# Patient Record
Sex: MALE | Race: WHITE | NOT HISPANIC OR LATINO
[De-identification: names, ages, dates, MRNs, and addresses within clinical notes are randomized per-mention and may not be internally consistent; named-entity substitution may affect disease eponyms.]

---
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
## 2021-02-01 ENCOUNTER — TRANSCRIPTION ENCOUNTER (OUTPATIENT)
Age: 21
End: 2021-02-01

## 2022-12-31 ENCOUNTER — EMERGENCY (EMERGENCY)
Facility: HOSPITAL | Age: 22
LOS: 1 days | Discharge: HOME | End: 2022-12-31
Attending: EMERGENCY MEDICINE
Payer: COMMERCIAL

## 2022-12-31 VITALS
RESPIRATION RATE: 18 BRPM | SYSTOLIC BLOOD PRESSURE: 113 MMHG | DIASTOLIC BLOOD PRESSURE: 69 MMHG | OXYGEN SATURATION: 100 % | HEART RATE: 93 BPM | TEMPERATURE: 98 F

## 2022-12-31 VITALS
SYSTOLIC BLOOD PRESSURE: 124 MMHG | RESPIRATION RATE: 18 BRPM | OXYGEN SATURATION: 94 % | HEART RATE: 124 BPM | WEIGHT: 160.06 LBS | TEMPERATURE: 98 F | HEIGHT: 72 IN | DIASTOLIC BLOOD PRESSURE: 72 MMHG

## 2022-12-31 DIAGNOSIS — J03.90 ACUTE TONSILLITIS, UNSPECIFIED: ICD-10-CM

## 2022-12-31 LAB
ALBUMIN SERPL ELPH-MCNC: 3.9 G/DL — SIGNIFICANT CHANGE UP (ref 3.3–5)
ALP SERPL-CCNC: 84 U/L — SIGNIFICANT CHANGE UP (ref 40–120)
ALT FLD-CCNC: 28 U/L — SIGNIFICANT CHANGE UP (ref 10–45)
ANION GAP SERPL CALC-SCNC: 13 MMOL/L — SIGNIFICANT CHANGE UP (ref 5–17)
AST SERPL-CCNC: 27 U/L — SIGNIFICANT CHANGE UP (ref 10–40)
BASOPHILS # BLD AUTO: 0 K/UL — SIGNIFICANT CHANGE UP (ref 0–0.2)
BASOPHILS NFR BLD AUTO: 0 % — SIGNIFICANT CHANGE UP (ref 0–2)
BILIRUB SERPL-MCNC: 0.6 MG/DL — SIGNIFICANT CHANGE UP (ref 0.2–1.2)
BUN SERPL-MCNC: 16 MG/DL — SIGNIFICANT CHANGE UP (ref 7–23)
CALCIUM SERPL-MCNC: 10 MG/DL — SIGNIFICANT CHANGE UP (ref 8.4–10.5)
CHLORIDE SERPL-SCNC: 98 MMOL/L — SIGNIFICANT CHANGE UP (ref 96–108)
CO2 SERPL-SCNC: 24 MMOL/L — SIGNIFICANT CHANGE UP (ref 22–31)
CREAT SERPL-MCNC: 0.98 MG/DL — SIGNIFICANT CHANGE UP (ref 0.5–1.3)
EGFR: 113 ML/MIN/1.73M2 — SIGNIFICANT CHANGE UP
EOSINOPHIL # BLD AUTO: 0 K/UL — SIGNIFICANT CHANGE UP (ref 0–0.5)
EOSINOPHIL NFR BLD AUTO: 0 % — SIGNIFICANT CHANGE UP (ref 0–6)
GIANT PLATELETS BLD QL SMEAR: PRESENT — SIGNIFICANT CHANGE UP
GLUCOSE SERPL-MCNC: 115 MG/DL — HIGH (ref 70–99)
HCT VFR BLD CALC: 38.6 % — LOW (ref 39–50)
HGB BLD-MCNC: 14 G/DL — SIGNIFICANT CHANGE UP (ref 13–17)
HIV 1+2 AB+HIV1 P24 AG SERPL QL IA: SIGNIFICANT CHANGE UP
LYMPHOCYTES # BLD AUTO: 1.26 K/UL — SIGNIFICANT CHANGE UP (ref 1–3.3)
LYMPHOCYTES # BLD AUTO: 9.3 % — LOW (ref 13–44)
MANUAL SMEAR VERIFICATION: SIGNIFICANT CHANGE UP
MCHC RBC-ENTMCNC: 31.3 PG — SIGNIFICANT CHANGE UP (ref 27–34)
MCHC RBC-ENTMCNC: 36.3 GM/DL — HIGH (ref 32–36)
MCV RBC AUTO: 86.2 FL — SIGNIFICANT CHANGE UP (ref 80–100)
MONOCYTES # BLD AUTO: 1.39 K/UL — HIGH (ref 0–0.9)
MONOCYTES NFR BLD AUTO: 10.2 % — SIGNIFICANT CHANGE UP (ref 2–14)
NEUTROPHILS # BLD AUTO: 10.95 K/UL — HIGH (ref 1.8–7.4)
NEUTROPHILS NFR BLD AUTO: 71.2 % — SIGNIFICANT CHANGE UP (ref 43–77)
NEUTS BAND # BLD: 9.3 % — HIGH (ref 0–8)
PLAT MORPH BLD: NORMAL — SIGNIFICANT CHANGE UP
PLATELET # BLD AUTO: 269 K/UL — SIGNIFICANT CHANGE UP (ref 150–400)
POTASSIUM SERPL-MCNC: 3.9 MMOL/L — SIGNIFICANT CHANGE UP (ref 3.5–5.3)
POTASSIUM SERPL-SCNC: 3.9 MMOL/L — SIGNIFICANT CHANGE UP (ref 3.5–5.3)
PROT SERPL-MCNC: 7.6 G/DL — SIGNIFICANT CHANGE UP (ref 6–8.3)
RAPID RVP RESULT: SIGNIFICANT CHANGE UP
RBC # BLD: 4.48 M/UL — SIGNIFICANT CHANGE UP (ref 4.2–5.8)
RBC # FLD: 11.8 % — SIGNIFICANT CHANGE UP (ref 10.3–14.5)
RBC BLD AUTO: NORMAL — SIGNIFICANT CHANGE UP
S PYO AG SPEC QL IA: NEGATIVE — SIGNIFICANT CHANGE UP
SARS-COV-2 RNA SPEC QL NAA+PROBE: SIGNIFICANT CHANGE UP
SODIUM SERPL-SCNC: 135 MMOL/L — SIGNIFICANT CHANGE UP (ref 135–145)
WBC # BLD: 13.6 K/UL — HIGH (ref 3.8–10.5)
WBC # FLD AUTO: 13.6 K/UL — HIGH (ref 3.8–10.5)

## 2022-12-31 PROCEDURE — 96374 THER/PROPH/DIAG INJ IV PUSH: CPT | Mod: XU

## 2022-12-31 PROCEDURE — 86308 HETEROPHILE ANTIBODY SCREEN: CPT

## 2022-12-31 PROCEDURE — 85025 COMPLETE CBC W/AUTO DIFF WBC: CPT

## 2022-12-31 PROCEDURE — 87081 CULTURE SCREEN ONLY: CPT

## 2022-12-31 PROCEDURE — 87880 STREP A ASSAY W/OPTIC: CPT

## 2022-12-31 PROCEDURE — 87389 HIV-1 AG W/HIV-1&-2 AB AG IA: CPT

## 2022-12-31 PROCEDURE — 96375 TX/PRO/DX INJ NEW DRUG ADDON: CPT | Mod: XU

## 2022-12-31 PROCEDURE — 70491 CT SOFT TISSUE NECK W/DYE: CPT | Mod: 26,MG

## 2022-12-31 PROCEDURE — 99284 EMERGENCY DEPT VISIT MOD MDM: CPT | Mod: 25

## 2022-12-31 PROCEDURE — 99285 EMERGENCY DEPT VISIT HI MDM: CPT

## 2022-12-31 PROCEDURE — G1004: CPT

## 2022-12-31 PROCEDURE — 31505 DIAGNOSTIC LARYNGOSCOPY: CPT

## 2022-12-31 PROCEDURE — 70491 CT SOFT TISSUE NECK W/DYE: CPT | Mod: MG

## 2022-12-31 PROCEDURE — 0225U NFCT DS DNA&RNA 21 SARSCOV2: CPT

## 2022-12-31 PROCEDURE — 80053 COMPREHEN METABOLIC PANEL: CPT

## 2022-12-31 RX ORDER — KETOROLAC TROMETHAMINE 30 MG/ML
15 SYRINGE (ML) INJECTION ONCE
Refills: 0 | Status: DISCONTINUED | OUTPATIENT
Start: 2022-12-31 | End: 2022-12-31

## 2022-12-31 RX ORDER — DEXAMETHASONE 0.5 MG/5ML
8 ELIXIR ORAL ONCE
Refills: 0 | Status: COMPLETED | OUTPATIENT
Start: 2022-12-31 | End: 2022-12-31

## 2022-12-31 RX ORDER — DEXAMETHASONE 0.5 MG/5ML
8 ELIXIR ORAL ONCE
Refills: 0 | Status: DISCONTINUED | OUTPATIENT
Start: 2022-12-31 | End: 2022-12-31

## 2022-12-31 RX ORDER — AMPICILLIN SODIUM AND SULBACTAM SODIUM 250; 125 MG/ML; MG/ML
INJECTION, POWDER, FOR SUSPENSION INTRAMUSCULAR; INTRAVENOUS
Refills: 0 | Status: DISCONTINUED | OUTPATIENT
Start: 2022-12-31 | End: 2023-01-03

## 2022-12-31 RX ORDER — AMPICILLIN SODIUM AND SULBACTAM SODIUM 250; 125 MG/ML; MG/ML
3 INJECTION, POWDER, FOR SUSPENSION INTRAMUSCULAR; INTRAVENOUS ONCE
Refills: 0 | Status: COMPLETED | OUTPATIENT
Start: 2022-12-31 | End: 2022-12-31

## 2022-12-31 RX ORDER — AMPICILLIN SODIUM AND SULBACTAM SODIUM 250; 125 MG/ML; MG/ML
3 INJECTION, POWDER, FOR SUSPENSION INTRAMUSCULAR; INTRAVENOUS EVERY 6 HOURS
Refills: 0 | Status: DISCONTINUED | OUTPATIENT
Start: 2022-12-31 | End: 2023-01-03

## 2022-12-31 RX ORDER — SODIUM CHLORIDE 9 MG/ML
1000 INJECTION, SOLUTION INTRAVENOUS ONCE
Refills: 0 | Status: COMPLETED | OUTPATIENT
Start: 2022-12-31 | End: 2022-12-31

## 2022-12-31 RX ADMIN — Medication 101.6 MILLIGRAM(S): at 11:40

## 2022-12-31 RX ADMIN — AMPICILLIN SODIUM AND SULBACTAM SODIUM 200 GRAM(S): 250; 125 INJECTION, POWDER, FOR SUSPENSION INTRAMUSCULAR; INTRAVENOUS at 11:41

## 2022-12-31 RX ADMIN — Medication 15 MILLIGRAM(S): at 11:52

## 2022-12-31 RX ADMIN — SODIUM CHLORIDE 1000 MILLILITER(S): 9 INJECTION, SOLUTION INTRAVENOUS at 12:48

## 2022-12-31 NOTE — ED PROVIDER NOTE - OBJECTIVE STATEMENT
21 yo Male with no significant PMHx, presenting with swollen tonsils since Wednesday. Patient states that he was feeling unwell since Saturday, but noticed the tonsils being swollen Tuesday night. Patient able to tolerate secretions, noticed muffled voice changes since 2 days ago. Able to tolerate PO intake with difficulty 2/2 to pain. One episode of emesis. Patient also reports intermittent fevers to 104. Patient was prescribed ampicillin per pediatrician. Also been taking tylenol, ibuprofen, and benzocaine spray for pain. Was sent in by PCP for ENT eval. Patient denies shortness of breath.

## 2022-12-31 NOTE — ED PROVIDER NOTE - PHYSICAL EXAMINATION
Const: not in acute distress  Eyes: no conjunctival injection  HEENT: Head NCAT, dry MM. Bilateral swollen tonsils. Uvula midline. Hot potato voice present.  Neck: Trachea midline.   CVS: +S1/S2, Peripheral pulses 2+ and equal in all extremities.  RESP: Unlabored respiratory effort. Clear to auscultation bilaterally.  GI: Nontender/Nondistended, No CVA tenderness b/l.   MSK: Normocephalic/Atraumatic, No Lower Extremities edema b/l.   Skin: Intact.   Neuro: CNs II-XII grossly intact. Motor & Sensation grossly intact.  Psych: Awake, Alert, & Oriented (AAO) x3. Const: not in acute distress  Eyes: no conjunctival injection  HEENT: Head NCAT, dry MM. Bilateral swollen tonsils with exudate. Uvula midline. Hot potato voice present. No tongue elevation.  Neck: Trachea midline. full range of motion in neck.   CVS: +S1/S2, Peripheral pulses 2+ and equal in all extremities.  RESP: Unlabored respiratory effort. Clear to auscultation bilaterally.  GI: Nontender/Nondistended, No CVA tenderness b/l.   MSK: Normocephalic/Atraumatic, No Lower Extremities edema b/l.   Skin: Intact.   Neuro: CNs II-XII grossly intact. Motor & Sensation grossly intact.  Psych: Awake, Alert, & Oriented (AAO) x3. Const: not in acute distress  Eyes: no conjunctival injection  HEENT: Head NCAT, dry MM. Bilateral swollen tonsils with exudate. Uvula midline. Muffled voice present. No tongue elevation.  Neck: Trachea midline. full range of motion in neck.   CVS: +S1/S2, Peripheral pulses 2+ and equal in all extremities.  RESP: Unlabored respiratory effort. Clear to auscultation bilaterally.  GI: Nontender/Nondistended, No CVA tenderness b/l.   MSK: Normocephalic/Atraumatic, No Lower Extremities edema b/l.   Skin: Intact.   Neuro: CNs II-XII grossly intact. Motor & Sensation grossly intact.  Psych: Awake, Alert, & Oriented (AAO) x3.

## 2022-12-31 NOTE — ED ADULT TRIAGE NOTE - IDEAL BODY WEIGHT(KG)
79 I have personally performed a face to face diagnostic evaluation on this patient. I have reviewed the ACP note and agree with the history, exam and plan of care, except as noted.

## 2022-12-31 NOTE — ED PROVIDER NOTE - NS ED ROS FT
CONST: (+) fevers, no chills, no trauma  EYES: no blurry vision   ENT: (+) swollen tonsils, (+) voice changes  CV: no chest pain, no extremity swelling  RESP: no shortness of breath  ABD: no abdominal pain, no nausea, (+) vomiting, no diarrhea, no melena  : no dysuria, no hematuria, no frequency  MSK: no back pain, no neck pain, no extremity pain  NEURO: no headache, no focal weakness, no dizziness  HEME: no bruising  SKIN: no rash

## 2022-12-31 NOTE — ED PROVIDER NOTE - NSFOLLOWUPINSTRUCTIONS_ED_ALL_ED_FT
You were seen in the Emergency Department for swollen tonsils. You received a dose of antibiotics and steroids in the emergency room, which helped with your swelling. You were evaluated by ENT, which did not see inflammation in your airway. You also received a CT of your neck, which showed __. You will be discharged with augmentin and a steroid dose pack.     1) Advance activity as tolerated.   2) Continue all previously prescribed medications as directed.    3) Follow up with your primary care physician in 24-48 hours - take copies of your results.    4) Return to the Emergency Department for worsening or persistent symptoms, and/or ANY NEW OR CONCERNING SYMPTOMS. You were seen in the Emergency Department for swollen tonsils. You received a dose of antibiotics and steroids in the emergency room. You were evaluated by ENT, which did not see inflammation in your airway. You also received a CT of your neck, which showed swelling of your tonsils. You will be discharged with augmentin 875 mg (1 tab) twice a day for 7 days and a steroid dose pack, use as directed on the package.    1) Advance activity as tolerated.   2) Continue all previously prescribed medications as directed.    3) Follow up with your primary care physician in 24-48 hours - take copies of your results.    4) Return to the Emergency Department for worsening or persistent symptoms, inability to swallow saliva/food, noisy breathing/difficulty breathing, and/or ANY NEW OR CONCERNING SYMPTOMS.

## 2022-12-31 NOTE — ED PROVIDER NOTE - PROGRESS NOTE DETAILS
MD Angelica (PGY-1) Patient stable. CT results reviewed. ENT scope did not see inflammation of the airways. Will dispo patient with meds and f/u with pediatrician.

## 2022-12-31 NOTE — ED ADULT NURSE REASSESSMENT NOTE - NS ED NURSE REASSESS COMMENT FT1
Pt d/c Amb Accomp by Mother Per pt " I feel better." Denies pain Resp even and nonlab 02 sat 100% r/a

## 2022-12-31 NOTE — ED ADULT NURSE NOTE - OBJECTIVE STATEMENT
22 yr old male , denies med hx or meds, c/o swollen tonsils since Wednesday. C/o fever everyday  Refer for ENT. Poss abscess. Able to tolerate PO intake with difficulty 2/2 to pain. Has been on ampicillin since Wednesday. Denies SOB or chest pain

## 2022-12-31 NOTE — CONSULT NOTE ADULT - PROBLEM SELECTOR RECOMMENDATION 9
IV decadron  IV unasyn  Medrol dose pack and ungmentin as outpt   Pain control  Peridex gargles  F/U with ENT as outpt

## 2022-12-31 NOTE — ED PROVIDER NOTE - CLINICAL SUMMARY MEDICAL DECISION MAKING FREE TEXT BOX
21 yo Male with no significant PMHx, presenting with swollen tonsils since Wednesday. Swollen tonsils with voice changes. Able to tolerate secretions. Afebrile on arrival, but tachycardic to 120s. Concern for tonsilitis vs RPA vs PTA, possibly HIV pharyngitis, less likely Yuriy angina, given no swelling of mouth floor. Will get CBC, CMP, RVP, and HIV. Will treat with steroids, unasyn, and toradol. . Will also get CT neck to evaluate for RPA/PTA. ENT consulted for scope. 21 yo Male with no significant PMHx, presenting with swollen tonsils since Wednesday. Swollen tonsils with voice changes. Able to tolerate secretions. Afebrile on arrival, but tachycardic to 120s. Concern for tonsilitis vs RPA vs PTA, possibly HIV pharyngitis, less likely Yuriy angina, given no swelling of mouth floor. Will get CBC, CMP, RVP, and HIV. Will treat with steroids, unasyn, and toradol. . Will also get CT neck to evaluate for RPA/PTA. ENT consulted for scope.    Attending Statement: Agree with the above.  Significant tonsillar hypertrophy/erythema/exudate c muffled voice but tolerating secretions and in no respiratory distress, no stridor on exam, no limitations to neck range of motion, no submental induration, no tongue elevation.  Likely pharyngitis but b/l PTA possible though less likely.  RPA possible.  No real c/f ludwigs or lemierre's.  Overall patient is nontoxic appearing.  Tachycardia noted on triage vitals.  Plan as above, will c/s ENT for scope though unlikely to have airway compromise.  Will reassess after meds and workup.  --BMM 21 yo Male with no significant PMHx, presenting with swollen tonsils since Wednesday. Swollen tonsils with voice changes. Able to tolerate secretions. Afebrile on arrival, but tachycardic to 120s. Concern for pharyngitis vs RPA vs PTA, possibly HIV pharyngitis vs mono, less likely Yuriy angina, given no swelling of mouth floor. Will get CBC, CMP, RVP, mono, and HIV. Will treat with steroids, unasyn, and toradol. . Will also get CT neck to evaluate for RPA/PTA. ENT consulted for scope.    Attending Statement: Agree with the above.  Significant tonsillar hypertrophy/erythema/exudate c muffled voice but tolerating secretions and in no respiratory distress, no stridor on exam, no limitations to neck range of motion, no submental induration, no tongue elevation.  Likely pharyngitis but b/l PTA possible though less likely.  RPA possible.  No real c/f ludwigs or lemierre's.  Overall patient is nontoxic appearing.  Tachycardia noted on triage vitals.  Plan as above, will c/s ENT for scope though unlikely to have airway compromise.  Will reassess after meds and workup.  --BMM

## 2022-12-31 NOTE — ED PROVIDER NOTE - PATIENT PORTAL LINK FT
You can access the FollowMyHealth Patient Portal offered by [facility] by registering at the following website: [email or web address]. By joining [facility]’s FollowMyHealth portal, you will also be able to view your health information using other applications (apps) compatible with our system.

## 2022-12-31 NOTE — CONSULT NOTE ADULT - SUBJECTIVE AND OBJECTIVE BOX
CC: Throat pain    HPI: 21 yo Male with no significant PMHx, presenting with throat pain since Wednesday. Patient states that he was feeling unwell since Saturday, but noticed the tonsils being swollen Tuesday night. Patient able to tolerate secretions, noticed muffled voice changes since 2 days ago. Able to tolerate PO intake with difficulty 2/2 to pain. One episode of emesis. Patient also reports intermittent fevers to 104. Patient was prescribed ampicillin per pediatrician. Also been taking tylenol, ibuprofen, and benzocaine spray for pain. Was sent in by PCP for ENT eval. Patient denies shortness of breath.        PAST MEDICAL & SURGICAL HISTORY:  No pertinent past medical history      No significant past surgical history        Allergies    No Known Allergies    Intolerances      MEDICATIONS  (STANDING):  ampicillin/sulbactam  IVPB      ampicillin/sulbactam  IVPB 3 Gram(s) IV Intermittent every 6 hours    MEDICATIONS  (PRN):      Social History: no tobacco use    Family history: no pertinent FHx    ROS:   ENT: all negative except as noted in HPI   CV: denies palpitations  Pulm: denies SOB, cough, hemoptysis  GI: denies change in apetite, indigestion, n/v  : denies pertinent urinary symptoms, urgency  Neuro: denies numbness/tingling, loss of sensation  Psych: denies anxiety  MS: denies muscle weakness, instability  Heme: denies easy bruising or bleeding  Endo: denies heat/cold intolerance, excessive sweating  Vascular: denies LE edema    Vital Signs Last 24 Hrs  T(C): 36.4 (31 Dec 2022 14:15), Max: 36.9 (31 Dec 2022 10:51)  T(F): 97.5 (31 Dec 2022 14:15), Max: 98.4 (31 Dec 2022 10:51)  HR: 93 (31 Dec 2022 14:15) (93 - 124)  BP: 113/69 (31 Dec 2022 14:15) (113/69 - 124/72)  BP(mean): --  RR: 18 (31 Dec 2022 14:15) (18 - 18)  SpO2: 100% (31 Dec 2022 14:15) (94% - 100%)    Parameters below as of 31 Dec 2022 14:15  Patient On (Oxygen Delivery Method): room air                              14.0   13.60 )-----------( 269      ( 31 Dec 2022 11:44 )             38.6    12-31    135  |  98  |  16  ----------------------------<  115<H>  3.9   |  24  |  0.98    Ca    10.0      31 Dec 2022 11:44    TPro  7.6  /  Alb  3.9  /  TBili  0.6  /  DBili  x   /  AST  27  /  ALT  28  /  AlkPhos  84  12-31       PHYSICAL EXAM:  Gen: NAD  Skin: No rashes, bruises, or lesions  Head: Normocephalic, Atraumatic  Face: no edema, erythema, or fluctuance. Parotid glands soft without mass  Eyes: no scleral injection    Nose: Nares bilaterally patent, no discharge  Mouth: B/L tonsillar enlargement with exudates, tonsils 4+, uvula midline with edema, No Stridor / Drooling / Trismus.  Mucosa moist, tongue midline  Neck: B/L lymphadenopathy, trachea midline, no masses  Lymphatic: No lymphadenopathy  Resp: breathing easily, no stridor  CV: no peripheral edema/cyanosis  GI: nondistended   Peripheral vascular: no JVD or edema  Neuro: facial nerve intact, no facial droop    Fiberoptic Indirect laryngoscopy:  (Scope #2 used)    Reason for Laryngoscopy:    Patient was unable to cooperate with mirror.  B/L tonsillar edema with exudates, Nasopharynx, oropharynx, and hypopharynx clear, no bleeding. Tongue base, posterior pharyngeal wall, vallecula, epiglottis, and subglottis appear normal. No erythema, edema, pooling of secretions, masses or lesions. Airway patent, no foreign body visualized. No glottic/supraglottic edema. True vocal cords, arytenoids, vestibular folds, ventricles, pyriform sinuses, and aryepiglottic folds appear normal bilaterally. Vocal cords mobile with good contact b/l.    Neck CT:   IMPRESSION:  Marked hypertrophy of the palatine tonsils with midline apposition   compatible with acute tonsillitis. No peritonsillar or retropharyngeal   abscess.

## 2022-12-31 NOTE — ED ADULT TRIAGE NOTE - CHIEF COMPLAINT QUOTE
swollen tonsils for 4 days, currently on Ampicillin, reports difficulty swallowing and voice changes, sent in by PCP for ENT evaluation

## 2022-12-31 NOTE — CONSULT NOTE ADULT - ASSESSMENT
23 yo Male with no significant PMHx, presenting with throat pain since Wednesday. Pt was found to have acute tonsillitis. Neck CT also revealed acute tonsillitis, no abscess. Pt may go home if he's able to tolerate PO intake. WBC 13.3

## 2023-01-01 LAB
CULTURE RESULTS: SIGNIFICANT CHANGE UP
SPECIMEN SOURCE: SIGNIFICANT CHANGE UP

## 2023-01-02 LAB
HETEROPH AB TITR SER AGGL: NEGATIVE — SIGNIFICANT CHANGE UP